# Patient Record
Sex: MALE | URBAN - METROPOLITAN AREA
[De-identification: names, ages, dates, MRNs, and addresses within clinical notes are randomized per-mention and may not be internally consistent; named-entity substitution may affect disease eponyms.]

---

## 2022-04-19 ENCOUNTER — LAB REQUISITION (OUTPATIENT)
Dept: LAB | Facility: HOSPITAL | Age: 52
End: 2022-04-19
Payer: COMMERCIAL

## 2022-04-19 DIAGNOSIS — K62.1 RECTAL POLYP: ICD-10-CM

## 2022-04-19 PROCEDURE — 88305 TISSUE EXAM BY PATHOLOGIST: CPT | Performed by: PATHOLOGY

## 2024-10-16 ENCOUNTER — OFFICE VISIT (OUTPATIENT)
Dept: FAMILY MEDICINE CLINIC | Facility: CLINIC | Age: 54
End: 2024-10-16

## 2024-10-16 VITALS
BODY MASS INDEX: 23.84 KG/M2 | DIASTOLIC BLOOD PRESSURE: 90 MMHG | OXYGEN SATURATION: 98 % | HEART RATE: 119 BPM | TEMPERATURE: 98.1 F | SYSTOLIC BLOOD PRESSURE: 148 MMHG | WEIGHT: 176 LBS | HEIGHT: 72 IN

## 2024-10-16 DIAGNOSIS — R00.0 TACHYCARDIA: ICD-10-CM

## 2024-10-16 DIAGNOSIS — F11.21 OPIOID USE DISORDER, MODERATE, IN EARLY REMISSION (HCC): ICD-10-CM

## 2024-10-16 DIAGNOSIS — G47.00 INSOMNIA, UNSPECIFIED TYPE: Primary | ICD-10-CM

## 2024-10-16 DIAGNOSIS — R79.89 LOW TSH LEVEL: ICD-10-CM

## 2024-10-16 DIAGNOSIS — I10 PRIMARY HYPERTENSION: ICD-10-CM

## 2024-10-16 PROBLEM — F19.11 DRUG ABUSE IN REMISSION (HCC): Status: ACTIVE | Noted: 2024-10-16

## 2024-10-16 PROBLEM — F11.20 OPIOID DEPENDENCE (HCC): Status: ACTIVE | Noted: 2024-10-16

## 2024-10-16 PROCEDURE — 99214 OFFICE O/P EST MOD 30 MIN: CPT

## 2024-10-16 PROCEDURE — 93000 ELECTROCARDIOGRAM COMPLETE: CPT

## 2024-10-16 RX ORDER — HYDROXYZINE HYDROCHLORIDE 25 MG/1
25 TABLET, FILM COATED ORAL
Qty: 30 TABLET | Refills: 1 | Status: SHIPPED | OUTPATIENT
Start: 2024-10-16

## 2024-10-16 RX ORDER — LISINOPRIL 10 MG/1
10 TABLET ORAL DAILY
Qty: 30 TABLET | Refills: 1 | Status: SHIPPED | OUTPATIENT
Start: 2024-10-16

## 2024-10-16 RX ORDER — QUETIAPINE FUMARATE 100 MG/1
100 TABLET, FILM COATED ORAL
COMMUNITY

## 2024-10-16 NOTE — ASSESSMENT & PLAN NOTE
BP today in office is 148/90 elevated  Recounts that in rehab he had high BP systolic in 180  Pt educated on the importance of lifestyle modifications  Recommend at-home BP monitoring to ensure control   ECG for baseline and for c/o infrequent chest tightness, ECG normal today  start Lisinopril for HTN  Will recheck at 1 month fu  Orders:    lisinopril (ZESTRIL) 10 mg tablet; Take 1 tablet (10 mg total) by mouth daily    POCT ECG

## 2024-10-16 NOTE — PROGRESS NOTES
Ambulatory Visit  Name: Leif Huitron      : 1970      MRN: [id number]  Encounter Provider: Leif Stone PA-C  Encounter Date: 10/16/2024   Encounter department: [facility]    Assessment & Plan  Insomnia, unspecified type  Pt states that he has had insomnia staying & falling asleep x 1 year  Self reports work related, stressful job and schedule at home  Is taking seroquel 100mg started at rehab, continued at discharge  Also was taking hydroxyzine in rehab, today states better but still struggles  Will trial 25mg hydroxyzine for sleep PRN  Orders:    hydrOXYzine HCL (ATARAX) 25 mg tablet; Take 1 tablet (25 mg total) by mouth daily at bedtime Take as needed for sleep    Primary hypertension  BP today in office is 148/90  elevated  Recounts that in rehab he had high BP systolic in 180  Pt educated on the importance of lifestyle modifications  Recommend at-home BP monitoring to ensure control   ECG for baseline and for c/o infrequent chest tightness, ECG normal today  start Lisinopril for HTN  Will recheck at 1 month fu  Orders:    lisinopril (ZESTRIL) 10 mg tablet; Take 1 tablet (10 mg total) by mouth daily    POCT ECG    Opioid use disorder, moderate, in early remission (HCC)  Pt was taking unprescribed percocet x 9 months  States was also potentially impure   Self-checked into short term rehab 10/07 x 1 week detox  started on seroquel 100mg  Today he presents feeling well and motivated to refrain from substance use  Does relate insomnia to recent stress of admission as well  Will continue to monitor       Low TSH level  TSH of .10 noted on discharge labs from rehab facility  Free/serum thyroid levels are wnl   Pt denies Sx of palpitations, excessive sweating, ocular abnormality   Asx at this time, will repeat TSH next visit  Orders:    TSH, 3rd generation with Free T4 reflex; Future    Tachycardia  Pt presents with resting    Exam also reveals tachycardia w/o  "murmur  Denies cardiac Sx today. No chest pain SOB difficultly breathing   Does recount infrequent Hx of \"chest tightness\" 2 x last 5 years   ECG today reveals only tachycardia  Will continue to monitor, consider holter if recurrent          Depression Screening and Follow-up Plan: Patient was screened for depression during today's encounter. They screened negative with a PHQ-2 score of 2.    Tobacco Cessation Counseling: Tobacco cessation counseling was provided. The patient is sincerely urged to quit consumption of tobacco. He is not ready to quit tobacco. Medication options and side effects of medication discussed. Patient agreed to medication. Not ready yet, would be open to it in future    History of Present Illness     Leif Huitron is a 54 y.o. male with Hx of GENO presenting for evaluation of BP, insomnia, pounding in his chest, and to establish care. Of note, he was recently in short term rehab 1 x week detox at [facility]: He was taking percocet for 9 months total, 3 of which patient self describes as being addicted to. This medication was not prescribed to him and given by \"a friend,\" he also states that the medication ended up being fake. He self checked himself in to rehab, was taking hydroxyzine 50mg and seroquel 100mg QHS for sleep at that time, discharged on seroquel monotherapy. He continues to follow with them outpt meetings 1x per week.    He states Hx of HTN while in rehab and BP is elevated today. He also recounts some chest tightness that occurs sporadically for him, 2x in the last 5 years, none recently. He does recount that when driving during one of these episodes he had to pull over d/t the Sx. He denies chest pain at rest or today, these have been isloated episodes and have not happened recently.  Also c/o Insomina for the last year, attributes to work stress, difficulty falling and staying asleep    Exam remarkable for tachycardia without murmur and mild upper lobe " wheeze , otherwise unremarkable today.  Routine blood work was reviewed from detox facility, no blood count anomalies noted. TSH low but serum Thyroid hormone levels wnl. Will repeat. Will also obtain lipid panel.  previous encounters with Rehab facility, reviewed at this visit  Follow up 1 month(s) for recheck      History obtained from : patient  Review of Systems   Constitutional:  Positive for fatigue. Negative for chills and fever.   HENT:  Negative for ear pain and sore throat.    Eyes:  Negative for pain and visual disturbance.   Respiratory:  Negative for cough and shortness of breath.    Cardiovascular:  Negative for chest pain and palpitations.   Gastrointestinal:  Negative for abdominal pain and vomiting.   Genitourinary:  Negative for dysuria and hematuria.   Musculoskeletal:  Negative for arthralgias and back pain.   Skin:  Negative for color change and rash.   Neurological:  Negative for seizures and syncope.   Psychiatric/Behavioral:  Positive for sleep disturbance (insomnia).    All other systems reviewed and are negative.    Pertinent Medical History       Medical History Reviewed by provider this encounter:  Tobacco  Allergies  Meds  Problems  Med Hx  Surg Hx  Fam Hx       Past Medical History   History reviewed. No pertinent past medical history.  History reviewed. No pertinent surgical history.  Family History   Problem Relation Age of Onset    Diabetes Mother      Current Outpatient Medications on File Prior to Visit   Medication Sig Dispense Refill    QUEtiapine (SEROquel) 100 mg tablet Take 100 mg by mouth daily at bedtime       No current facility-administered medications on file prior to visit.   No Known Allergies   Current Outpatient Medications on File Prior to Visit   Medication Sig Dispense Refill    QUEtiapine (SEROquel) 100 mg tablet Take 100 mg by mouth daily at bedtime       No current facility-administered medications on file prior to visit.      Social History     Tobacco  Use    Smoking status: Every Day     Current packs/day: 1.00     Average packs/day: 1 pack/day for 20.8 years (20.8 ttl pk-yrs)     Types: Cigarettes     Start date: 2004     Passive exposure: Current    Smokeless tobacco: Never   Vaping Use    Vaping status: Never Used   Substance and Sexual Activity    Alcohol use: Yes     Comment: rare    Drug use: Not Currently    Sexual activity: Not on file         Objective     /90   Pulse (!) 119   Temp 98.1 °F (36.7 °C)   Ht 6' (1.829 m)   Wt 79.8 kg (176 lb)   SpO2 98%   BMI 23.87 kg/m²     Physical Exam  Vitals and nursing note reviewed.   Constitutional:       General: He is not in acute distress.     Appearance: He is well-developed.   HENT:      Head: Normocephalic and atraumatic.      Right Ear: Tympanic membrane normal. There is no impacted cerumen.      Left Ear: Tympanic membrane normal. There is no impacted cerumen.      Nose: Nose normal.      Mouth/Throat:      Mouth: Mucous membranes are moist.   Eyes:      Conjunctiva/sclera: Conjunctivae normal.   Cardiovascular:      Rate and Rhythm: Regular rhythm. Tachycardia present.      Heart sounds: No murmur heard.  Pulmonary:      Effort: Pulmonary effort is normal. No respiratory distress.      Breath sounds: Wheezing (mild upper lobes) present.   Abdominal:      Palpations: Abdomen is soft.      Tenderness: There is no abdominal tenderness.   Musculoskeletal:         General: No swelling.      Cervical back: Neck supple.   Skin:     General: Skin is warm and dry.      Capillary Refill: Capillary refill takes less than 2 seconds.   Neurological:      Mental Status: He is alert.   Psychiatric:         Mood and Affect: Mood normal.       Administrative Statements   I have spent a total time of 30 minutes in caring for this patient on the day of the visit/encounter including Prognosis, Risks and benefits of tx options, Instructions for management, Importance of tx compliance, Risk factor reductions,  Impressions, Documenting in the medical record, Reviewing / ordering tests, medicine, procedures  , and Obtaining or reviewing history  .

## 2024-10-16 NOTE — ASSESSMENT & PLAN NOTE
Pt was taking unprescribed percocet x 9 months  States was also potentially impure   Self-checked into short term rehab 10/07 x 1 week detox  started on seroquel 100mg  Today he presents feeling well and motivated to refrain from substance use  Does relate insomnia to recent stress of admission as well  Will continue to monitor

## 2024-10-16 NOTE — ASSESSMENT & PLAN NOTE
Pt states that he has had insomnia staying & falling asleep x 1 year  Self reports work related, stressful job and schedule at home  Is taking seroquel 100mg started at rehab, continued at discharge  Also was taking hydroxyzine in rehab, today states better but still struggles  Will trial 25mg hydroxyzine for sleep PRN  Orders:    hydrOXYzine HCL (ATARAX) 25 mg tablet; Take 1 tablet (25 mg total) by mouth daily at bedtime Take as needed for sleep

## 2024-10-16 NOTE — ASSESSMENT & PLAN NOTE
TSH of .10 noted on discharge labs from rehab facility  Free/serum thyroid levels are wnl   Pt denies Sx of palpitations, excessive sweating, ocular abnormality   Asx at this time, will repeat TSH next visit  Orders:    TSH, 3rd generation with Free T4 reflex; Future

## 2024-10-17 ENCOUNTER — TELEPHONE (OUTPATIENT)
Dept: ADMINISTRATIVE | Facility: OTHER | Age: 54
End: 2024-10-17

## 2024-10-17 NOTE — LETTER
Procedure Request Form: Colonoscopy      Date Requested: 10/17/24  Patient: Leif Huitron  Patient : 1970   Referring Provider: Leif Stone PA-C        Date of Procedure ________2022 report______________________       The above patient has informed us that they have completed their   most recent Colonoscopy at your facility. Please complete   this form and attach all corresponding procedure reports/results.    Comments __________________________________________________________  ____________________________________________________________________  ____________________________________________________________________  ____________________________________________________________________    Facility Completing Procedure _________________________________________    Form Completed By (print name) _______________________________________      Signature __________________________________________________________      These reports are needed for  compliance.    Please fax this completed form and a copy of the procedure report to our office located at [address] [place] [address] as soon as possible to Fax [phone] attention Rehan: Phone [phone]    We thank you for your assistance in treating our mutual patient.

## 2024-10-17 NOTE — LETTER
Procedure Request Form: Colonoscopy      Date Requested: 10/21/24  Patient: Leif Huitron       2nd Request  Patient : 1970   Referring Provider: Leif Stone PA-C        Date of Procedure _______2022 report_______________________       The above patient has informed us that they have completed their   most recent Colonoscopy at your facility. Please complete   this form and attach all corresponding procedure reports/results.    Comments __________________________________________________________  ____________________________________________________________________  ____________________________________________________________________  ____________________________________________________________________    Facility Completing Procedure _________________________________________    Form Completed By (print name) _______________________________________      Signature __________________________________________________________      These reports are needed for  compliance.    Please fax this completed form and a copy of the procedure report to our office located at [address] [place] [address] as soon as possible to Fax [phone] attention Rehan: Phone [phone]    We thank you for your assistance in treating our mutual patient.

## 2024-10-17 NOTE — TELEPHONE ENCOUNTER
Upon review of the In Basket request and the patient's chart, initial outreach has been made via fax to facility. Please see Contacts section for details.     Thank you  Rehan Arnold MA

## 2024-10-17 NOTE — TELEPHONE ENCOUNTER
Upon review of the In Basket request we have found/obtained the documentation. After careful review of the document we are unable to complete this request for CRC: Colonoscopy because the documentation does not have the result(s) needed to close the requested care gap(s).    Any additional questions or concerns should be emailed to the Practice Liaisons via the appropriate education email address, please do not reply via In Basket.    Thank you  Rehan Arnold MA   [facility]

## 2024-10-21 NOTE — TELEPHONE ENCOUNTER
As a follow-up, a second attempt has been made for outreach via fax to facility. Please see Contacts section for details.    Thank you  Rehan Arnold MA

## 2024-10-24 NOTE — TELEPHONE ENCOUNTER
As a final attempt, a third outreach has been made via telephone call to facility. Please see Contacts section for details. This encounter will be closed and completed by end of day. Should we receive the requested information because of previous outreach attempts, the requested patient's chart will be updated appropriately.     Thank you  Rehan Arnold MA

## 2024-11-19 ENCOUNTER — OFFICE VISIT (OUTPATIENT)
Dept: FAMILY MEDICINE CLINIC | Facility: CLINIC | Age: 54
End: 2024-11-19

## 2024-11-19 VITALS
BODY MASS INDEX: 26.28 KG/M2 | WEIGHT: 194 LBS | HEART RATE: 98 BPM | TEMPERATURE: 97.5 F | DIASTOLIC BLOOD PRESSURE: 100 MMHG | SYSTOLIC BLOOD PRESSURE: 150 MMHG | OXYGEN SATURATION: 98 % | HEIGHT: 72 IN

## 2024-11-19 DIAGNOSIS — F11.21 OPIOID USE DISORDER, MODERATE, IN EARLY REMISSION (HCC): ICD-10-CM

## 2024-11-19 DIAGNOSIS — I10 PRIMARY HYPERTENSION: Primary | ICD-10-CM

## 2024-11-19 DIAGNOSIS — G47.00 INSOMNIA, UNSPECIFIED TYPE: ICD-10-CM

## 2024-11-19 DIAGNOSIS — Z00.00 ROUTINE HEALTH MAINTENANCE: ICD-10-CM

## 2024-11-19 DIAGNOSIS — F17.219 CIGARETTE NICOTINE DEPENDENCE WITH NICOTINE-INDUCED DISORDER: ICD-10-CM

## 2024-11-19 PROCEDURE — 99213 OFFICE O/P EST LOW 20 MIN: CPT

## 2024-11-19 RX ORDER — DOXEPIN HYDROCHLORIDE 10 MG/1
10 CAPSULE ORAL
Qty: 30 CAPSULE | Refills: 0 | Status: SHIPPED | OUTPATIENT
Start: 2024-11-19 | End: 2024-11-19

## 2024-11-19 RX ORDER — LISINOPRIL 10 MG/1
20 TABLET ORAL DAILY
Qty: 30 TABLET | Refills: 1 | Status: SHIPPED | OUTPATIENT
Start: 2024-11-19

## 2024-11-19 RX ORDER — DOXEPIN HYDROCHLORIDE 10 MG/1
10 CAPSULE ORAL
Qty: 30 CAPSULE | Refills: 1 | Status: SHIPPED | OUTPATIENT
Start: 2024-11-19 | End: 2024-12-19

## 2024-11-19 RX ORDER — NICOTINE 21 MG/24HR
1 PATCH, TRANSDERMAL 24 HOURS TRANSDERMAL EVERY 24 HOURS
Qty: 28 PATCH | Refills: 0 | Status: SHIPPED | OUTPATIENT
Start: 2024-11-19

## 2024-11-19 NOTE — PROGRESS NOTES
Name: Leif Huitron      : 1970      MRN: [id number]  Encounter Provider: Leif Stone PA-C  Encounter Date: 2024   Encounter department: [facility]    Assessment & Plan  Primary hypertension  At last visit started Lisinopril 10 mg        Insomnia, unspecified type  Started hydroxyzine at last visit for insomnia       Routine health maintenance              History of Present Illness   {?Quick Links Encounters * My Last Note * Last Note in Specialty * Snapshot * Since Last Visit * History :23031}  HPI  Review of Systems  No past medical history on file.  No past surgical history on file.  Family History   Problem Relation Age of Onset    Diabetes Mother      Social History     Tobacco Use    Smoking status: Every Day     Current packs/day: 1.00     Average packs/day: 1 pack/day for 20.9 years (20.9 ttl pk-yrs)     Types: Cigarettes     Start date:      Passive exposure: Current    Smokeless tobacco: Never   Vaping Use    Vaping status: Never Used   Substance and Sexual Activity    Alcohol use: Yes     Comment: rare    Drug use: Not Currently    Sexual activity: Not on file     Current Outpatient Medications on File Prior to Visit   Medication Sig    hydrOXYzine HCL (ATARAX) 25 mg tablet Take 1 tablet (25 mg total) by mouth daily at bedtime Take as needed for sleep    lisinopril (ZESTRIL) 10 mg tablet Take 1 tablet (10 mg total) by mouth daily    QUEtiapine (SEROquel) 100 mg tablet Take 100 mg by mouth daily at bedtime     No Known Allergies    There is no immunization history on file for this patient.  Objective {?Quick Links Trend Vitals * Enter New Vitals * Results Review * Timeline (Adult) * Labs * Imaging * Cardiology * Procedures * Lung Cancer Screening * Surgical eConsent :78300}  There were no vitals taken for this visit.    Physical Exam  {Administrative / Billing Section (Optional):56479}

## 2024-11-19 NOTE — ASSESSMENT & PLAN NOTE
Today hypertension remains uncontrolled today 150/100   He does admit to taking medication at inconsistent times and did not take today  I recommended he take medication at same time each day, no AE of medication  Given HTN levels will increase Lisinopril 20mg   Also discussed starting metoprolol given level of HTN and higher BPM HR  Per pt preference would like to increase lisinopril and assess response before starting another medication  Follow up:1 month(s), if new onset chest pain, difficulty breathing , shortness of breath, loss of consciousness , or changes in vision or senses, new onset HA report to the ED, and pt is in agreement.  Orders:    lisinopril (ZESTRIL) 10 mg tablet; Take 2 tablets (20 mg total) by mouth daily

## 2024-11-19 NOTE — ASSESSMENT & PLAN NOTE
Started hydroxyzine at last visit for insomnia, states did not work and caused RLS for pt  Will start doxepin for insomnia, would like to hold controlled medication at this time d/t pt Hx  Follow up:1 month(s) and pt is in agreement.  Orders:    doxepin (SINEquan) 10 mg capsule; Take 1 capsule (10 mg total) by mouth daily at bedtime Take 30 minutes before bedtime

## 2024-11-19 NOTE — ASSESSMENT & PLAN NOTE
3 meetings a week with sponsor now  States doing well, sober, with support from family in home  No adverse effects reported though suspect that insomnia may be complicated by this  Will continue to monitor

## 2024-11-19 NOTE — PROGRESS NOTES
Name: Leif Huitron      : 1970      MRN: [id number]  Encounter Provider: Leif Stone PA-C  Encounter Date: 2024   Encounter department: [facility]    Assessment & Plan  Primary hypertension  Today hypertension remains uncontrolled today 150/100   He does admit to taking medication at inconsistent times and did not take today  I recommended he take medication at same time each day, no AE of medication  Given HTN levels will increase Lisinopril 20mg   Also discussed starting metoprolol given level of HTN and higher BPM HR  Per pt preference would like to increase lisinopril and assess response before starting another medication  Follow up:1 month(s), if new onset chest pain, difficulty breathing , shortness of breath, loss of consciousness , or changes in vision or senses, new onset HA  report to the ED, and pt is in agreement.  Orders:    lisinopril (ZESTRIL) 10 mg tablet; Take 2 tablets (20 mg total) by mouth daily    Insomnia, unspecified type  Started hydroxyzine at last visit for insomnia, states did not work and caused RLS for pt  Will start doxepin for insomnia, would like to hold controlled medication at this time d/t pt Hx  Follow up:1 month(s) and pt is in agreement.  Orders:    doxepin (SINEquan) 10 mg capsule; Take 1 capsule (10 mg total) by mouth daily at bedtime Take 30 minutes before bedtime    Opioid use disorder, moderate, in early remission (HCC)  3 meetings a week with sponsor now  States doing well, sober, with support from family in home  No adverse effects reported though suspect that insomnia may be complicated by this  Will continue to monitor        Cigarette nicotine dependence with nicotine-induced disorder  Pt motivated to quit  Orders:    nicotine (NICODERM CQ) 14 mg/24hr TD 24 hr patch; Place 1 patch on the skin over 24 hours every 24 hours    Routine health maintenance    Orders:    Lipid panel; Future         History of Present Illness      Leif Huitron is a 54 y.o. male  presenting for 1m f/u. He states that he had an adverse reaction to hydroxyzine and it has not worked, he is also hypertensive again today.              Review of Systems   Constitutional:  Negative for chills and fever.   HENT:  Negative for ear pain and sore throat.    Eyes:  Negative for pain and visual disturbance.   Respiratory:  Negative for cough and shortness of breath.    Cardiovascular:  Negative for chest pain and palpitations.   Gastrointestinal:  Negative for abdominal pain and vomiting.   Genitourinary:  Negative for dysuria and hematuria.   Musculoskeletal:  Negative for arthralgias and back pain.   Skin:  Negative for color change and rash.   Neurological:  Negative for seizures and syncope.   Psychiatric/Behavioral:  Positive for sleep disturbance.    All other systems reviewed and are negative.    History reviewed. No pertinent past medical history.  History reviewed. No pertinent surgical history.  Family History   Problem Relation Age of Onset    Diabetes Mother      Social History     Tobacco Use    Smoking status: Every Day     Current packs/day: 1.00     Average packs/day: 1 pack/day for 20.9 years (20.9 ttl pk-yrs)     Types: Cigarettes     Start date: 2004     Passive exposure: Current    Smokeless tobacco: Never   Vaping Use    Vaping status: Never Used   Substance and Sexual Activity    Alcohol use: Yes     Comment: rare    Drug use: Not Currently    Sexual activity: Not on file     Current Outpatient Medications on File Prior to Visit   Medication Sig    hydrOXYzine HCL (ATARAX) 25 mg tablet Take 1 tablet (25 mg total) by mouth daily at bedtime Take as needed for sleep    lisinopril (ZESTRIL) 10 mg tablet Take 1 tablet (10 mg total) by mouth daily    QUEtiapine (SEROquel) 100 mg tablet Take 100 mg by mouth daily at bedtime     No Known Allergies    There is no immunization history on file for this patient.  Objective   BP (!) 168/108 (BP Location:  Left arm, Patient Position: Sitting, Cuff Size: Large)   Pulse 98   Temp 97.5 °F (36.4 °C)   Ht 6' (1.829 m)   Wt 88 kg (194 lb)   SpO2 98%   BMI 26.31 kg/m²     Physical Exam  Vitals and nursing note reviewed.   Constitutional:       General: He is not in acute distress.     Appearance: He is well-developed.   HENT:      Head: Normocephalic and atraumatic.   Eyes:      Conjunctiva/sclera: Conjunctivae normal.   Cardiovascular:      Rate and Rhythm: Normal rate and regular rhythm.      Heart sounds: No murmur heard.  Pulmonary:      Effort: Pulmonary effort is normal. No respiratory distress.      Breath sounds: Normal breath sounds.   Abdominal:      Palpations: Abdomen is soft.      Tenderness: There is no abdominal tenderness.   Musculoskeletal:         General: No swelling.      Cervical back: Neck supple.   Skin:     General: Skin is warm and dry.      Capillary Refill: Capillary refill takes less than 2 seconds.   Neurological:      Mental Status: He is alert and oriented to person, place, and time.   Psychiatric:         Mood and Affect: Mood normal.

## 2024-12-04 ENCOUNTER — APPOINTMENT (OUTPATIENT)
Dept: LAB | Facility: CLINIC | Age: 54
End: 2024-12-04
Payer: COMMERCIAL

## 2024-12-04 DIAGNOSIS — R79.89 LOW TSH LEVEL: ICD-10-CM

## 2024-12-04 DIAGNOSIS — Z00.00 ROUTINE HEALTH MAINTENANCE: ICD-10-CM

## 2024-12-04 LAB
CHOLEST SERPL-MCNC: 184 MG/DL (ref ?–200)
HDLC SERPL-MCNC: 40 MG/DL
LDLC SERPL CALC-MCNC: 119 MG/DL (ref 0–100)
NONHDLC SERPL-MCNC: 144 MG/DL
TRIGL SERPL-MCNC: 123 MG/DL (ref ?–150)
TSH SERPL DL<=0.05 MIU/L-ACNC: 0.56 UIU/ML (ref 0.45–4.5)

## 2024-12-04 PROCEDURE — 36415 COLL VENOUS BLD VENIPUNCTURE: CPT

## 2024-12-04 PROCEDURE — 84443 ASSAY THYROID STIM HORMONE: CPT

## 2024-12-04 PROCEDURE — 80061 LIPID PANEL: CPT

## 2024-12-05 ENCOUNTER — RESULTS FOLLOW-UP (OUTPATIENT)
Dept: FAMILY MEDICINE CLINIC | Facility: CLINIC | Age: 54
End: 2024-12-05

## 2024-12-12 PROBLEM — R79.89 LOW TSH LEVEL: Status: RESOLVED | Noted: 2024-10-16 | Resolved: 2024-12-12

## 2024-12-20 PROBLEM — F17.210 CIGARETTE NICOTINE DEPENDENCE WITHOUT COMPLICATION: Status: ACTIVE | Noted: 2024-12-20

## 2024-12-20 NOTE — ASSESSMENT & PLAN NOTE
Increased lisinopril to 20 mg last visit  BP today in office today was elevated, on my recheck 138/88 borderline poor controled    Recommend at-home BP monitoring to ensure control   start lotrel, discontinue lisinopril monotherapy   Follow-up 1 month  Orders:    amLODIPine-benazepril (LOTREL 5-20) 5-20 MG per capsule; Take 1 capsule by mouth daily

## 2024-12-20 NOTE — PROGRESS NOTES
Name: Leif Huitron      : 1970      MRN: [id number]  Encounter Provider: Leif Stone PA-C  Encounter Date: 2024   Encounter department: [facility]    Assessment & Plan  Primary hypertension  Increased lisinopril to 20 mg last visit  BP today in office today was elevated, on my recheck 138/88  borderline poor controled    Recommend at-home BP monitoring to ensure control   start lotrel, discontinue lisinopril monotherapy   Follow-up 1 month  Orders:    amLODIPine-benazepril (LOTREL 5-20) 5-20 MG per capsule; Take 1 capsule by mouth daily    Insomnia, unspecified type  Hx of trying hydroxyzine without improvement   At last visit started doxepin 10 mg   Today presents stating continued poor control of sleep, some improvement but is still waking up during the night  After discussion of risk benefits and adverse effects, including sleepwalking/activities while sleeping and dependence, will start zolpidem 5 mg as needed  Did discuss that with a history of GENO patient should be cautious about taking this medication, take only as needed.  He does inform he plans to have his significant other administer medication to him.  Follow-up in 1 month  Orders:    zolpidem (AMBIEN) 5 mg tablet; Take 1 tablet (5 mg total) by mouth daily at bedtime as needed for sleep         History of Present Illness     Leif Huitron is a 54 y.o. male  presenting for follow-up for insomnia and high blood pressure.  Today his sleep is only mildly improved, his blood pressure is slightly more controlled.            Review of Systems   Constitutional:  Negative for chills and fever.   HENT:  Negative for ear pain, nosebleeds, postnasal drip and sinus pressure.    Eyes:  Negative for pain and visual disturbance.   Respiratory:  Negative for shortness of breath.    Cardiovascular:  Negative for chest pain and palpitations.   Gastrointestinal:  Negative for abdominal pain, constipation, diarrhea, nausea  and vomiting.   Genitourinary:  Negative for dysuria and hematuria.   Musculoskeletal:  Negative for arthralgias and back pain.   Skin:  Negative for color change and rash.   Neurological:  Negative for seizures and syncope.   Psychiatric/Behavioral:  Positive for sleep disturbance (continued insomnia).    All other systems reviewed and are negative.    No past medical history on file.  No past surgical history on file.  Family History   Problem Relation Age of Onset    Diabetes Mother      Social History     Tobacco Use    Smoking status: Every Day     Current packs/day: 1.00     Average packs/day: 1 pack/day for 21.0 years (21.0 ttl pk-yrs)     Types: Cigarettes     Start date: 2004     Passive exposure: Current    Smokeless tobacco: Never   Vaping Use    Vaping status: Never Used   Substance and Sexual Activity    Alcohol use: Yes     Comment: rare    Drug use: Not Currently    Sexual activity: Not on file     Current Outpatient Medications on File Prior to Visit   Medication Sig    doxepin (SINEquan) 10 mg capsule Take 1 capsule (10 mg total) by mouth daily at bedtime Take 30 minutes before bedtime    lisinopril (ZESTRIL) 10 mg tablet Take 2 tablets (20 mg total) by mouth daily    nicotine (NICODERM CQ) 14 mg/24hr TD 24 hr patch Place 1 patch on the skin over 24 hours every 24 hours    QUEtiapine (SEROquel) 100 mg tablet Take 100 mg by mouth daily at bedtime     Allergies   Allergen Reactions    Atarax [Hydroxyzine] Anxiety     Restless leg syndrome per pt        There is no immunization history on file for this patient.  Objective   There were no vitals taken for this visit.    Physical Exam  Vitals and nursing note reviewed.   Constitutional:       General: He is not in acute distress.     Appearance: He is well-developed.   HENT:      Head: Normocephalic and atraumatic.   Eyes:      Conjunctiva/sclera: Conjunctivae normal.   Cardiovascular:      Rate and Rhythm: Normal rate and regular rhythm.      Heart  sounds: No murmur heard.  Pulmonary:      Effort: Pulmonary effort is normal. No respiratory distress.      Breath sounds: Normal breath sounds.   Abdominal:      Palpations: Abdomen is soft.      Tenderness: There is no abdominal tenderness.   Musculoskeletal:         General: No swelling.      Cervical back: Neck supple.   Skin:     General: Skin is warm and dry.      Capillary Refill: Capillary refill takes less than 2 seconds.   Neurological:      Mental Status: He is alert.   Psychiatric:         Mood and Affect: Mood normal.

## 2024-12-20 NOTE — ASSESSMENT & PLAN NOTE
Hx of trying hydroxyzine without improvement   At last visit started doxepin 10 mg   Today presents stating continued poor control of sleep, some improvement but is still waking up during the night  After discussion of risk benefits and adverse effects, including sleepwalking/activities while sleeping and dependence, will start zolpidem 5 mg as needed  Did discuss that with a history of GENO patient should be cautious about taking this medication, take only as needed.  He does inform he plans to have his significant other administer medication to him.  Follow-up in 1 month  Orders:    zolpidem (AMBIEN) 5 mg tablet; Take 1 tablet (5 mg total) by mouth daily at bedtime as needed for sleep

## 2024-12-23 ENCOUNTER — OFFICE VISIT (OUTPATIENT)
Dept: FAMILY MEDICINE CLINIC | Facility: CLINIC | Age: 54
End: 2024-12-23

## 2024-12-23 VITALS
HEIGHT: 72 IN | WEIGHT: 197 LBS | SYSTOLIC BLOOD PRESSURE: 138 MMHG | HEART RATE: 104 BPM | BODY MASS INDEX: 26.68 KG/M2 | DIASTOLIC BLOOD PRESSURE: 88 MMHG | OXYGEN SATURATION: 98 % | TEMPERATURE: 97.9 F

## 2024-12-23 DIAGNOSIS — I10 PRIMARY HYPERTENSION: Primary | ICD-10-CM

## 2024-12-23 DIAGNOSIS — G47.00 INSOMNIA, UNSPECIFIED TYPE: ICD-10-CM

## 2024-12-23 PROCEDURE — 99213 OFFICE O/P EST LOW 20 MIN: CPT

## 2024-12-23 RX ORDER — AMLODIPINE AND BENAZEPRIL HYDROCHLORIDE 5; 20 MG/1; MG/1
1 CAPSULE ORAL DAILY
Qty: 30 CAPSULE | Refills: 1 | Status: SHIPPED | OUTPATIENT
Start: 2024-12-23

## 2024-12-23 RX ORDER — ZOLPIDEM TARTRATE 5 MG/1
5 TABLET ORAL
Qty: 30 TABLET | Refills: 0 | Status: SHIPPED | OUTPATIENT
Start: 2024-12-23

## 2024-12-24 ENCOUNTER — TELEPHONE (OUTPATIENT)
Dept: ADMINISTRATIVE | Facility: OTHER | Age: 54
End: 2024-12-24

## 2024-12-24 NOTE — LETTER
Procedure Request Form: Colonoscopy      Date Requested: 24       2nd Request  Patient: Leif Huitron  Patient : 1970   Referring Provider: Leif Stone PA-C        Date of Procedure ____ report__________________________       The above patient has informed us that they have completed their   most recent Colonoscopy at your facility. Please complete   this form and attach all corresponding procedure reports/results.    Comments __________________________________________________________  ____________________________________________________________________  ____________________________________________________________________  ____________________________________________________________________    Facility Completing Procedure _________________________________________    Form Completed By (print name) _______________________________________      Signature __________________________________________________________      These reports are needed for  compliance.    Please fax this completed form and a copy of the procedure report to our office located at [address] [place] [address] as soon as possible to Fax [phone] attention Rehan: Phone [phone]    We thank you for your assistance in treating our mutual patient.

## 2024-12-24 NOTE — LETTER
Procedure Request Form: Colonoscopy      Date Requested: 24  Patient: Leif Huitron  Patient : 1970   Referring Provider: Leif Stone PA-C        Date of Procedure _________ report_____________________       The above patient has informed us that they have completed their   most recent Colonoscopy at your facility. Please complete   this form and attach all corresponding procedure reports/results.    Comments __________________________________________________________  ____________________________________________________________________  ____________________________________________________________________  ____________________________________________________________________    Facility Completing Procedure _________________________________________    Form Completed By (print name) _______________________________________      Signature __________________________________________________________      These reports are needed for  compliance.    Please fax this completed form and a copy of the procedure report to our office located at [address] [place] [address] as soon as possible to Fax [phone] attention Rehan: Phone [phone]    We thank you for your assistance in treating our mutual patient.

## 2024-12-24 NOTE — TELEPHONE ENCOUNTER
----- Message from Francine VAUGHN sent at 12/23/2024  1:13 PM EST -----  Regarding: [employer] Request  12/23/24 1:13 PM    Hello, our patient Leif Huitron has had CRC: Colonoscopy completed/performed. Please assist in updating the patient chart by making an External outreach to Dr Cespedes facility located in [place]. The date of service is 2022.    Thank you,  Francine Ventura MA  [facility]

## 2025-01-20 NOTE — PROGRESS NOTES
Adult Annual Physical  Name: Leif Huitron      : 1970      MRN: [id number]  Encounter Provider: Leif Stone PA-C  Encounter Date: 2025   Encounter department: [facility]    Assessment & Plan  Insomnia, unspecified type  Cautious initiation of zolpidem at last visit after failure of hydroxyzine and doxepin for sleep  Today presents stating zolpidem is helping sleep, getting much longer sleep than last seen  Using PRN as Rx, no adverse side effects reported  Controlled subs agreement signed at today's visit  Continue as needed use follow-up as needed       Primary hypertension  Started combo Rx lotrel at last visit after inadequate monotherapy response  BP today in office is 130/84 Well controlled     Recommend at-home BP monitoring to ensure control   continue Lotrel  Orders:    amLODIPine-benazepril (LOTREL 5-20) 5-20 MG per capsule; Take 1 capsule by mouth daily    Immunizations and preventive care screenings were discussed with patient today. Appropriate education was printed on patient's after visit summary.      Counseling:  Dental Health: discussed importance of regular tooth brushing, flossing, and dental visits.  Exercise: the importance of regular exercise/physical activity was discussed. Recommend exercise 3-5 times per week for at least 30 minutes.          History of Present Illness     Adult Annual Physical:  Patient presents for annual physical. Leif Huitron is a 54 y.o. male  presenting for HTN and insomnia f/u and is also due for annual    vaccines, care gaps, screenings, routine labs, reviewed at this visit. He denies routine vaccination today. He will look into where he last got CRC screening. .     Diet and Physical Activity:  - Diet/Nutrition: well balanced diet.  - Exercise: no formal exercise and walking.    Depression Screening:  - PHQ-2 Score: 0    General Health:  - Sleep: sleeps well and 4-6 hours of sleep on average.  - Hearing: normal  hearing right ear and normal hearing left ear.  - Vision: no vision problems.    Review of Systems   Constitutional:  Negative for chills and fever.   HENT:  Negative for ear pain and sore throat.    Eyes:  Negative for pain and visual disturbance.   Respiratory:  Negative for cough and shortness of breath.    Cardiovascular:  Negative for chest pain and palpitations.   Gastrointestinal:  Negative for abdominal pain and vomiting.   Genitourinary:  Negative for dysuria and hematuria.   Musculoskeletal:  Negative for arthralgias and back pain.   Skin:  Negative for color change and rash.   Neurological:  Negative for seizures and syncope.   Psychiatric/Behavioral:  Positive for sleep disturbance (Much improved).    All other systems reviewed and are negative.    Medical History Reviewed by provider this encounter:  Tobacco  Allergies  Meds  Problems  Med Hx  Surg Hx  Fam Hx     .  Past Medical History   History reviewed. No pertinent past medical history.  History reviewed. No pertinent surgical history.  Family History   Problem Relation Age of Onset    Diabetes Mother       reports that he has been smoking cigarettes. He started smoking about 21 years ago. He has a 21.1 pack-year smoking history. He has been exposed to tobacco smoke. He has never used smokeless tobacco. He reports current alcohol use. He reports that he does not currently use drugs.  Current Outpatient Medications on File Prior to Visit   Medication Sig Dispense Refill    zolpidem (AMBIEN) 5 mg tablet Take 1 tablet (5 mg total) by mouth daily at bedtime as needed for sleep 30 tablet 0    [DISCONTINUED] amLODIPine-benazepril (LOTREL 5-20) 5-20 MG per capsule Take 1 capsule by mouth daily 30 capsule 1    [DISCONTINUED] doxepin (SINEquan) 10 mg capsule Take 1 capsule (10 mg total) by mouth daily at bedtime Take 30 minutes before bedtime 30 capsule 1    [DISCONTINUED] nicotine (NICODERM CQ) 14 mg/24hr TD 24 hr patch Place 1 patch on the skin over  24 hours every 24 hours (Patient not taking: Reported on 12/23/2024) 28 patch 0     No current facility-administered medications on file prior to visit.     Allergies   Allergen Reactions    Atarax [Hydroxyzine] Anxiety     Restless leg syndrome per pt       Current Outpatient Medications on File Prior to Visit   Medication Sig Dispense Refill    zolpidem (AMBIEN) 5 mg tablet Take 1 tablet (5 mg total) by mouth daily at bedtime as needed for sleep 30 tablet 0    [DISCONTINUED] amLODIPine-benazepril (LOTREL 5-20) 5-20 MG per capsule Take 1 capsule by mouth daily 30 capsule 1    [DISCONTINUED] doxepin (SINEquan) 10 mg capsule Take 1 capsule (10 mg total) by mouth daily at bedtime Take 30 minutes before bedtime 30 capsule 1    [DISCONTINUED] nicotine (NICODERM CQ) 14 mg/24hr TD 24 hr patch Place 1 patch on the skin over 24 hours every 24 hours (Patient not taking: Reported on 12/23/2024) 28 patch 0     No current facility-administered medications on file prior to visit.      Social History     Tobacco Use    Smoking status: Every Day     Current packs/day: 1.00     Average packs/day: 1 pack/day for 21.1 years (21.1 ttl pk-yrs)     Types: Cigarettes     Start date: 2004     Passive exposure: Current    Smokeless tobacco: Never   Vaping Use    Vaping status: Never Used   Substance and Sexual Activity    Alcohol use: Yes     Comment: rare    Drug use: Not Currently    Sexual activity: Not on file       Objective   There were no vitals taken for this visit.    Physical Exam  Vitals and nursing note reviewed.   Constitutional:       General: He is not in acute distress.     Appearance: He is well-developed.   HENT:      Head: Normocephalic and atraumatic.      Nose: No congestion or rhinorrhea.   Eyes:      Conjunctiva/sclera: Conjunctivae normal.   Pulmonary:      Effort: Pulmonary effort is normal. No respiratory distress.   Abdominal:      General: Abdomen is flat.   Musculoskeletal:         General: No swelling.       Cervical back: Neck supple.   Skin:     General: Skin is warm and dry.      Capillary Refill: Capillary refill takes less than 2 seconds.   Neurological:      Mental Status: He is alert and oriented to person, place, and time.   Psychiatric:         Mood and Affect: Mood normal.

## 2025-01-20 NOTE — ASSESSMENT & PLAN NOTE
Started combo Rx lotrel at last visit after inadequate monotherapy response  BP today in office is 130/84 Well controlled     Recommend at-home BP monitoring to ensure control   continue Lotrel  Orders:    amLODIPine-benazepril (LOTREL 5-20) 5-20 MG per capsule; Take 1 capsule by mouth daily

## 2025-01-20 NOTE — ASSESSMENT & PLAN NOTE
Cautious initiation of zolpidem at last visit after failure of hydroxyzine and doxepin for sleep  Today presents stating zolpidem is helping sleep, getting much longer sleep than last seen  Using PRN as Rx, no adverse side effects reported  Controlled subs agreement signed at today's visit  Continue as needed use follow-up as needed

## 2025-01-21 ENCOUNTER — OFFICE VISIT (OUTPATIENT)
Dept: FAMILY MEDICINE CLINIC | Facility: CLINIC | Age: 55
End: 2025-01-21

## 2025-01-21 VITALS
BODY MASS INDEX: 27.09 KG/M2 | HEART RATE: 88 BPM | SYSTOLIC BLOOD PRESSURE: 130 MMHG | OXYGEN SATURATION: 97 % | DIASTOLIC BLOOD PRESSURE: 84 MMHG | TEMPERATURE: 97.3 F | HEIGHT: 72 IN | WEIGHT: 200 LBS

## 2025-01-21 DIAGNOSIS — G47.00 INSOMNIA, UNSPECIFIED TYPE: Primary | ICD-10-CM

## 2025-01-21 DIAGNOSIS — I10 PRIMARY HYPERTENSION: ICD-10-CM

## 2025-01-21 PROCEDURE — 99213 OFFICE O/P EST LOW 20 MIN: CPT

## 2025-01-21 RX ORDER — AMLODIPINE AND BENAZEPRIL HYDROCHLORIDE 5; 20 MG/1; MG/1
1 CAPSULE ORAL DAILY
Qty: 90 CAPSULE | Refills: 1 | Status: SHIPPED | OUTPATIENT
Start: 2025-01-21

## 2025-06-24 DIAGNOSIS — I10 PRIMARY HYPERTENSION: ICD-10-CM

## 2025-06-24 RX ORDER — AMLODIPINE AND BENAZEPRIL HYDROCHLORIDE 5; 20 MG/1; MG/1
1 CAPSULE ORAL DAILY
Qty: 90 CAPSULE | Refills: 1 | Status: SHIPPED | OUTPATIENT
Start: 2025-06-24